# Patient Record
Sex: FEMALE | Race: WHITE | Employment: OTHER | URBAN - METROPOLITAN AREA
[De-identification: names, ages, dates, MRNs, and addresses within clinical notes are randomized per-mention and may not be internally consistent; named-entity substitution may affect disease eponyms.]

---

## 2024-08-11 ENCOUNTER — OFFICE VISIT (OUTPATIENT)
Age: 79
End: 2024-08-11
Payer: MEDICARE

## 2024-08-11 VITALS
TEMPERATURE: 97.6 F | BODY MASS INDEX: 32.77 KG/M2 | WEIGHT: 173.6 LBS | HEART RATE: 87 BPM | SYSTOLIC BLOOD PRESSURE: 151 MMHG | HEIGHT: 61 IN | RESPIRATION RATE: 20 BRPM | OXYGEN SATURATION: 97 % | DIASTOLIC BLOOD PRESSURE: 80 MMHG

## 2024-08-11 DIAGNOSIS — J02.9 SORE THROAT: ICD-10-CM

## 2024-08-11 DIAGNOSIS — U07.1 COVID: Primary | ICD-10-CM

## 2024-08-11 LAB
S PYO AG THROAT QL: NEGATIVE
SARS-COV-2 AG UPPER RESP QL IA: POSITIVE
VALID CONTROL: ABNORMAL

## 2024-08-11 PROCEDURE — 87880 STREP A ASSAY W/OPTIC: CPT | Performed by: NURSE PRACTITIONER

## 2024-08-11 PROCEDURE — 87811 SARS-COV-2 COVID19 W/OPTIC: CPT | Performed by: NURSE PRACTITIONER

## 2024-08-11 PROCEDURE — 99203 OFFICE O/P NEW LOW 30 MIN: CPT | Performed by: NURSE PRACTITIONER

## 2024-08-11 PROCEDURE — G0463 HOSPITAL OUTPT CLINIC VISIT: HCPCS | Performed by: NURSE PRACTITIONER

## 2024-08-11 RX ORDER — CALCIUM CARBONATE/VITAMIN D3 600 MG-10
1 TABLET ORAL
COMMUNITY

## 2024-08-11 RX ORDER — BENZONATATE 200 MG/1
200 CAPSULE ORAL 3 TIMES DAILY PRN
Qty: 20 CAPSULE | Refills: 0 | Status: SHIPPED | OUTPATIENT
Start: 2024-08-11

## 2024-08-11 RX ORDER — LEVOTHYROXINE SODIUM 100 UG/1
TABLET ORAL
COMMUNITY
Start: 2024-06-24

## 2024-08-11 RX ORDER — LATANOPROSTENE BUNOD 0.24 MG/ML
SOLUTION/ DROPS OPHTHALMIC
COMMUNITY

## 2024-08-11 NOTE — PROGRESS NOTES
[facility]        NAME: Britt Richards is a 78 y.o. female  : 1945    MRN: [id number]  DATE: 2024  TIME: 9:12 AM    Assessment and Plan   COVID [U07.1]  1. COVID  benzonatate (TESSALON) 200 MG capsule      2. Sore throat  POCT rapid ANTIGEN strepA    Poct Covid 19 Rapid Antigen Test        Acute symptomatic symptom onset 2024 tested positive today with a POCT rapid COVID.  POCT rapid strep is negative.  Discussed with patient Paxlovid refusing at this time.  She will continue taking Mucinex and will start Tessalon Perles 3 times daily as needed for cough.  Discussed follow-up with PCP educated on side effects proper use medication.  Should increase fluids discussed vitamin C vitamin D and zinc.  Red flags discussed to report straight to ED such as worsening cough shortness of breath chest pain.  Patient verbalized understanding    Patient Instructions       Follow up with PCP in 3-5 days.  Proceed to  ER if symptoms worsen.    If tests have been performed at [facility], our office will contact you with results if changes need to be made to the care plan discussed with you at the visit.  You can review your full results on [facility].    Chief Complaint     Chief Complaint   Patient presents with   • Sore Throat     Sore throat that started 2 days ago with b/l ear pain. Hoarseness also painful. Painful swallow.          History of Present Illness       Patient is a 78-year-old female arrives with complaints of x 2 days sore throat bilateral ear pain increasing temperature coughing postnasal drainage diarrhea.  Denies nausea vomiting nasal congestion rhinorrhea shortness of breath chest pain.  POCT rapid strep is negative in office POCT rapid COVID was positive.        Review of Systems   Review of Systems   Constitutional:  Positive for fever. Negative for activity change, appetite change, chills and fatigue.   HENT:  Positive for ear pain (bilat), postnasal drip and sore  throat. Negative for congestion, rhinorrhea and sneezing.    Respiratory:  Positive for cough. Negative for chest tightness, shortness of breath and wheezing.    Cardiovascular:  Negative for chest pain and palpitations.   Gastrointestinal:  Positive for diarrhea. Negative for abdominal pain, constipation, nausea and vomiting.   Musculoskeletal:  Negative for arthralgias and myalgias.   Skin:  Negative for color change, pallor and rash.   Neurological:  Positive for headaches. Negative for dizziness, weakness and light-headedness.   Hematological:  Negative for adenopathy.   Psychiatric/Behavioral:  Negative for agitation and confusion.          Current Medications       Current Outpatient Medications:   •  benzonatate (TESSALON) 200 MG capsule, Take 1 capsule (200 mg total) by mouth 3 (three) times a day as needed for cough, Disp: 20 capsule, Rfl: 0  •  Calcium Carb-Cholecalciferol 600-10 MG-MCG TABS, Take 1 tablet by mouth daily with breakfast, Disp: , Rfl:   •  Latanoprostene Bunod (Vyzulta) 0.024 % SOLN, Apply to eye, Disp: , Rfl:   •  levothyroxine (Synthroid) 100 mcg tablet, , Disp: , Rfl:     Current Allergies     Allergies as of 08/11/2024 - Reviewed 08/11/2024   Allergen Reaction Noted   • Dorzolamide Visual Disturbance 03/02/2023   • Epinephrine Hives 12/27/2012   • Prednisone Dizziness and Nausea Only 07/31/2017   • Thyroid Myalgia 08/11/2024   • Indomethacin Rash 12/27/2012   • Tramadol Palpitations 12/27/2012            The following portions of the patient's history were reviewed and updated as appropriate: allergies, current medications, past family history, past medical history, past social history, past surgical history and problem list.     Past Medical History:   Diagnosis Date   • Disease of thyroid gland    • Glaucoma        History reviewed. No pertinent surgical history.    Family History   Problem Relation Age of Onset   • No Known Problems Mother    • No Known Problems Father   "        Medications have been verified.        Objective   /80   Pulse 87   Temp 97.6 °F (36.4 °C)   Resp 20   Ht 5' 1\" (1.549 m)   Wt 78.7 kg (173 lb 9.6 oz)   SpO2 97%   BMI 32.80 kg/m²   No LMP recorded. Patient is postmenopausal.       Physical Exam     Physical Exam  Vitals and nursing note reviewed.   Constitutional:       General: She is not in acute distress.     Appearance: Normal appearance. She is ill-appearing. She is not diaphoretic.   HENT:      Head: Normocephalic and atraumatic.      Right Ear: Tympanic membrane, ear canal and external ear normal. There is no impacted cerumen.      Left Ear: Tympanic membrane, ear canal and external ear normal. There is no impacted cerumen.      Nose: No congestion or rhinorrhea.      Mouth/Throat:      Pharynx: Posterior oropharyngeal erythema present.   Eyes:      General: No scleral icterus.        Right eye: No discharge.         Left eye: No discharge.      Conjunctiva/sclera: Conjunctivae normal.   Cardiovascular:      Rate and Rhythm: Normal rate and regular rhythm.   Pulmonary:      Effort: Pulmonary effort is normal. No respiratory distress.      Breath sounds: Normal breath sounds. No stridor. No wheezing, rhonchi or rales.   Musculoskeletal:         General: Normal range of motion.      Cervical back: Normal range of motion.   Lymphadenopathy:      Cervical: Cervical adenopathy present.   Skin:     Coloration: Skin is not jaundiced or pale.      Findings: No bruising, erythema or rash.   Neurological:      General: No focal deficit present.      Mental Status: She is alert and oriented to person, place, and time.   Psychiatric:         Mood and Affect: Mood normal.         Behavior: Behavior normal.         Thought Content: Thought content normal.         Judgment: Judgment normal.                   "